# Patient Record
Sex: MALE | URBAN - NONMETROPOLITAN AREA
[De-identification: names, ages, dates, MRNs, and addresses within clinical notes are randomized per-mention and may not be internally consistent; named-entity substitution may affect disease eponyms.]

---

## 2021-03-17 ENCOUNTER — IMPORTED ENCOUNTER (OUTPATIENT)
Dept: URBAN - NONMETROPOLITAN AREA CLINIC 1 | Facility: CLINIC | Age: 66
End: 2021-03-17

## 2021-03-17 PROCEDURE — 99203 OFFICE O/P NEW LOW 30 MIN: CPT
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
## 2021-06-14 PROBLEM — H35.3131: Noted: 2021-06-14

## 2021-06-14 PROBLEM — H53.19: Noted: 2021-03-17

## 2022-04-15 ASSESSMENT — TONOMETRY
OS_IOP_MMHG: 16
OD_IOP_MMHG: 16

## 2023-12-08 ENCOUNTER — COMPREHENSIVE EXAM (OUTPATIENT)
Dept: URBAN - NONMETROPOLITAN AREA CLINIC 4 | Facility: CLINIC | Age: 68
End: 2023-12-08

## 2023-12-08 DIAGNOSIS — H35.3132: ICD-10-CM

## 2023-12-08 DIAGNOSIS — H25.813: ICD-10-CM

## 2023-12-08 PROCEDURE — 92134 CPTRZ OPH DX IMG PST SGM RTA: CPT

## 2023-12-08 PROCEDURE — 99214 OFFICE O/P EST MOD 30 MIN: CPT

## 2023-12-08 ASSESSMENT — VISUAL ACUITY
OD_SC: 20/25
OU_SC: 20/25
OS_SC: 20/100
OS_PH: 20/70

## 2023-12-08 ASSESSMENT — TONOMETRY
OS_IOP_MMHG: 13
OD_IOP_MMHG: 15

## 2024-01-11 ENCOUNTER — CONSULTATION/EVALUATION (OUTPATIENT)
Dept: URBAN - NONMETROPOLITAN AREA CLINIC 4 | Facility: CLINIC | Age: 69
End: 2024-01-11

## 2024-01-11 DIAGNOSIS — H25.813: ICD-10-CM

## 2024-01-11 PROCEDURE — 92136 OPHTHALMIC BIOMETRY: CPT

## 2024-01-11 PROCEDURE — 92134 CPTRZ OPH DX IMG PST SGM RTA: CPT

## 2024-01-11 PROCEDURE — 92025 CPTRIZED CORNEAL TOPOGRAPHY: CPT

## 2024-01-11 PROCEDURE — 99214 OFFICE O/P EST MOD 30 MIN: CPT

## 2024-01-11 ASSESSMENT — VISUAL ACUITY
OS_CC: 20/70
OS_PH: 20/70
OS_AM: 20/30
OD_CC: 20/70
OD_SC: 20/25
OD_PAM: 20/30
OS_SC: 20/100

## 2024-01-11 ASSESSMENT — TONOMETRY
OD_IOP_MMHG: 13
OS_IOP_MMHG: 13

## 2024-02-26 ENCOUNTER — PRE-OP/H&P (OUTPATIENT)
Dept: RURAL CLINIC 1 | Facility: CLINIC | Age: 69
End: 2024-02-26

## 2024-02-26 VITALS
HEIGHT: 72 IN | HEART RATE: 64 BPM | SYSTOLIC BLOOD PRESSURE: 130 MMHG | DIASTOLIC BLOOD PRESSURE: 78 MMHG | WEIGHT: 172 LBS | BODY MASS INDEX: 23.3 KG/M2

## 2024-02-26 DIAGNOSIS — E78.2: ICD-10-CM

## 2024-02-26 DIAGNOSIS — I25.10: ICD-10-CM

## 2024-02-26 DIAGNOSIS — E11.9: ICD-10-CM

## 2024-02-26 DIAGNOSIS — K71.6: ICD-10-CM

## 2024-02-26 DIAGNOSIS — Z01.818: ICD-10-CM

## 2024-02-26 DIAGNOSIS — H91.90: ICD-10-CM

## 2024-02-26 PROCEDURE — 99213 OFFICE O/P EST LOW 20 MIN: CPT

## 2024-03-11 ENCOUNTER — POST-OP (OUTPATIENT)
Dept: URBAN - NONMETROPOLITAN AREA CLINIC 4 | Facility: CLINIC | Age: 69
End: 2024-03-11

## 2024-03-11 ENCOUNTER — SURGERY/PROCEDURE (OUTPATIENT)
Dept: URBAN - METROPOLITAN AREA SURGERY 3 | Facility: SURGERY | Age: 69
End: 2024-03-11

## 2024-03-11 DIAGNOSIS — Z96.1: ICD-10-CM

## 2024-03-11 DIAGNOSIS — H25.812: ICD-10-CM

## 2024-03-11 PROCEDURE — 66984 XCAPSL CTRC RMVL W/O ECP: CPT

## 2024-03-11 PROCEDURE — 68841 INSJ RX ELUT IMPLT LAC CANAL: CPT

## 2024-03-11 PROCEDURE — 99024 POSTOP FOLLOW-UP VISIT: CPT

## 2024-03-11 ASSESSMENT — TONOMETRY: OS_IOP_MMHG: 14

## 2024-03-11 ASSESSMENT — VISUAL ACUITY
OS_SC: 20/70-2
OD_SC: 20/30

## 2024-03-19 ENCOUNTER — POST-OP (OUTPATIENT)
Dept: URBAN - NONMETROPOLITAN AREA CLINIC 4 | Facility: CLINIC | Age: 69
End: 2024-03-19

## 2024-03-19 DIAGNOSIS — H25.811: ICD-10-CM

## 2024-03-19 PROCEDURE — 99213 OFFICE O/P EST LOW 20 MIN: CPT

## 2024-03-19 ASSESSMENT — TONOMETRY
OS_IOP_MMHG: 11
OD_IOP_MMHG: 13

## 2024-03-19 ASSESSMENT — VISUAL ACUITY
OD_SC: 20/40
OS_SC: 20/20

## 2024-03-25 ENCOUNTER — POST-OP (OUTPATIENT)
Dept: URBAN - NONMETROPOLITAN AREA CLINIC 4 | Facility: CLINIC | Age: 69
End: 2024-03-25

## 2024-03-25 ENCOUNTER — SURGERY/PROCEDURE (OUTPATIENT)
Dept: URBAN - METROPOLITAN AREA SURGERY 3 | Facility: SURGERY | Age: 69
End: 2024-03-25

## 2024-03-25 DIAGNOSIS — Z96.1: ICD-10-CM

## 2024-03-25 DIAGNOSIS — H25.811: ICD-10-CM

## 2024-03-25 PROCEDURE — 66984 XCAPSL CTRC RMVL W/O ECP: CPT

## 2024-03-25 PROCEDURE — 99024 POSTOP FOLLOW-UP VISIT: CPT

## 2024-03-25 PROCEDURE — 68841 INSJ RX ELUT IMPLT LAC CANAL: CPT

## 2024-03-25 ASSESSMENT — VISUAL ACUITY
OS_SC: 20/25
OD_SC: 20/30

## 2024-03-25 ASSESSMENT — TONOMETRY: OD_IOP_MMHG: 10

## 2024-04-03 ENCOUNTER — POST-OP (OUTPATIENT)
Dept: URBAN - NONMETROPOLITAN AREA CLINIC 4 | Facility: CLINIC | Age: 69
End: 2024-04-03

## 2024-04-03 DIAGNOSIS — Z96.1: ICD-10-CM

## 2024-04-03 PROCEDURE — 99024 POSTOP FOLLOW-UP VISIT: CPT

## 2024-04-03 ASSESSMENT — VISUAL ACUITY
OD_SC: 20/30+2
OS_SC: 20/25+2

## 2024-04-03 ASSESSMENT — TONOMETRY
OD_IOP_MMHG: 10
OS_IOP_MMHG: 11